# Patient Record
Sex: FEMALE | Race: WHITE | URBAN - METROPOLITAN AREA
[De-identification: names, ages, dates, MRNs, and addresses within clinical notes are randomized per-mention and may not be internally consistent; named-entity substitution may affect disease eponyms.]

---

## 2021-09-30 ENCOUNTER — OUT OF OFFICE VISIT (OUTPATIENT)
Dept: URBAN - METROPOLITAN AREA MEDICAL CENTER 1 | Facility: MEDICAL CENTER | Age: 71
End: 2021-09-30
Payer: MEDICARE

## 2021-09-30 DIAGNOSIS — K29.60 ADENOPAPILLOMATOSIS GASTRICA: ICD-10-CM

## 2021-09-30 DIAGNOSIS — Z79.1 ENCNTR LONG-TERM NSAID USE: ICD-10-CM

## 2021-09-30 DIAGNOSIS — K92.1 ACUTE MELENA: ICD-10-CM

## 2021-09-30 DIAGNOSIS — D64.89 ANEMIA DUE TO OTHER CAUSE: ICD-10-CM

## 2021-09-30 DIAGNOSIS — K29.00 ACUTE EROSIVE GASTRITIS: ICD-10-CM

## 2021-09-30 DIAGNOSIS — D50.9 ANEMIA: ICD-10-CM

## 2021-09-30 DIAGNOSIS — K25.9 ANTRAL ULCER: ICD-10-CM

## 2021-09-30 PROCEDURE — 99232 SBSQ HOSP IP/OBS MODERATE 35: CPT | Performed by: INTERNAL MEDICINE

## 2021-09-30 PROCEDURE — G8427 DOCREV CUR MEDS BY ELIG CLIN: HCPCS | Performed by: INTERNAL MEDICINE

## 2021-09-30 PROCEDURE — 43239 EGD BIOPSY SINGLE/MULTIPLE: CPT | Performed by: INTERNAL MEDICINE

## 2021-09-30 PROCEDURE — 99222 1ST HOSP IP/OBS MODERATE 55: CPT | Performed by: INTERNAL MEDICINE

## 2021-10-18 ENCOUNTER — OFFICE VISIT (OUTPATIENT)
Dept: URBAN - NONMETROPOLITAN AREA CLINIC 2 | Facility: CLINIC | Age: 71
End: 2021-10-18

## 2021-10-18 RX ORDER — ALBUTEROL SULFATE 90 UG/1
AEROSOL, METERED RESPIRATORY (INHALATION)
Qty: 54 | Status: ACTIVE | COMMUNITY

## 2021-10-18 RX ORDER — METOPROLOL TARTRATE 25 MG/1
TABLET, FILM COATED ORAL
Qty: 180 | Status: ACTIVE | COMMUNITY

## 2021-10-18 RX ORDER — FLUTICASONE PROPIONATE 50 UG/1
SPRAY, METERED NASAL
Qty: 32 | Status: ACTIVE | COMMUNITY

## 2021-10-18 RX ORDER — DENOSUMAB 60 MG/ML
INJECTION SUBCUTANEOUS
Qty: 1 | Status: ACTIVE | COMMUNITY

## 2021-10-18 RX ORDER — MELOXICAM 15 MG/1
TABLET ORAL
Qty: 90 | Status: ACTIVE | COMMUNITY

## 2021-10-18 RX ORDER — TOPIRAMATE 25 MG/1
TABLET, FILM COATED ORAL
Qty: 90 | Status: ACTIVE | COMMUNITY

## 2021-10-18 RX ORDER — PANTOPRAZOLE 40 MG/1
TABLET, DELAYED RELEASE ORAL
Qty: 60 | Status: ACTIVE | COMMUNITY

## 2021-11-17 ENCOUNTER — WEB ENCOUNTER (OUTPATIENT)
Dept: URBAN - NONMETROPOLITAN AREA CLINIC 2 | Facility: CLINIC | Age: 71
End: 2021-11-17

## 2021-11-17 ENCOUNTER — OFFICE VISIT (OUTPATIENT)
Dept: URBAN - NONMETROPOLITAN AREA CLINIC 2 | Facility: CLINIC | Age: 71
End: 2021-11-17
Payer: MEDICARE

## 2021-11-17 ENCOUNTER — LAB OUTSIDE AN ENCOUNTER (OUTPATIENT)
Dept: URBAN - NONMETROPOLITAN AREA CLINIC 2 | Facility: CLINIC | Age: 71
End: 2021-11-17

## 2021-11-17 VITALS
HEART RATE: 76 BPM | WEIGHT: 145.2 LBS | TEMPERATURE: 97.3 F | SYSTOLIC BLOOD PRESSURE: 146 MMHG | HEIGHT: 64 IN | DIASTOLIC BLOOD PRESSURE: 83 MMHG | BODY MASS INDEX: 24.79 KG/M2

## 2021-11-17 DIAGNOSIS — K59.00 CONSTIPATION, UNSPECIFIED CONSTIPATION TYPE: ICD-10-CM

## 2021-11-17 DIAGNOSIS — K62.5 BRIGHT RED BLOOD PER RECTUM: ICD-10-CM

## 2021-11-17 DIAGNOSIS — Z12.11 COLON CANCER SCREENING: ICD-10-CM

## 2021-11-17 DIAGNOSIS — D50.0 IRON DEFICIENCY ANEMIA DUE TO CHRONIC BLOOD LOSS: ICD-10-CM

## 2021-11-17 DIAGNOSIS — K25.0 ACUTE GASTRIC ULCER WITH HEMORRHAGE: ICD-10-CM

## 2021-11-17 PROCEDURE — 99214 OFFICE O/P EST MOD 30 MIN: CPT | Performed by: NURSE PRACTITIONER

## 2021-11-17 RX ORDER — TOPIRAMATE 25 MG/1
TABLET, FILM COATED ORAL
Qty: 90 | Status: ACTIVE | COMMUNITY

## 2021-11-17 RX ORDER — ALBUTEROL SULFATE 90 UG/1
AEROSOL, METERED RESPIRATORY (INHALATION)
Qty: 54 | Status: ACTIVE | COMMUNITY

## 2021-11-17 RX ORDER — MELOXICAM 15 MG/1
TABLET ORAL
Qty: 90 | Status: ACTIVE | COMMUNITY

## 2021-11-17 RX ORDER — PANTOPRAZOLE 40 MG/1
TABLET, DELAYED RELEASE ORAL
Qty: 60 | Status: ACTIVE | COMMUNITY

## 2021-11-17 RX ORDER — DENOSUMAB 60 MG/ML
INJECTION SUBCUTANEOUS
Qty: 1 | Status: ACTIVE | COMMUNITY

## 2021-11-17 RX ORDER — METOPROLOL TARTRATE 25 MG/1
TABLET, FILM COATED ORAL
Qty: 180 | Status: ACTIVE | COMMUNITY

## 2021-11-17 RX ORDER — FLUTICASONE PROPIONATE 50 UG/1
SPRAY, METERED NASAL
Qty: 32 | Status: ACTIVE | COMMUNITY

## 2021-11-17 RX ORDER — PANTOPRAZOLE SODIUM 40 MG/1
1 TABLET TABLET, DELAYED RELEASE ORAL BID
Qty: 180 TABLET | Refills: 3 | OUTPATIENT
Start: 2021-11-17

## 2021-11-17 NOTE — HPI-TODAY'S VISIT:
11/17/2021 Ms Viola Covington is a 71 year old female here for hospital f/u of GI bleeding. She started having dark stool the end of September. She required multiple transfusions and had an EGD with 2 large ulcers- 25mm and 10mm. She was started on protonix 40mg BID and her bleeding stopped. It was felt her meloxicam was contributing. She denies any further NSAID use. She was feeling dizzy after discharge so she on her own started taking oral iron. She has a hx of constipation and hemorrhoids. October 24, she started having bright red blood in her stool. She went to the ER and kept over night. She had a KUB with constipation. Her Hbg remained stable and she was able to be discharged. Her last colonsocopy was 2-3 years ago by Dr Vanegas in [facility]. She is taking 3 colace a day. She continues to have some constipation. CS

## 2021-11-18 LAB
HEMATOCRIT: 36.4
HEMOGLOBIN: 11.6
IRON BIND.CAP.(TIBC): 338
IRON SATURATION: 28
IRON: 96
MCH: 27.6
MCHC: 31.9
MCV: 87
NRBC: (no result)
PLATELETS: 248
RBC: 4.2
RDW: 16.7
UIBC: 242
WBC: 5.6

## 2021-11-22 ENCOUNTER — OFFICE VISIT (OUTPATIENT)
Dept: URBAN - NONMETROPOLITAN AREA SURGERY CENTER 1 | Facility: SURGERY CENTER | Age: 71
End: 2021-11-22
Payer: MEDICARE

## 2021-11-22 DIAGNOSIS — K27.9 PEPTIC ULCER: ICD-10-CM

## 2021-11-22 PROCEDURE — 43235 EGD DIAGNOSTIC BRUSH WASH: CPT | Performed by: INTERNAL MEDICINE

## 2021-11-22 PROCEDURE — G8907 PT DOC NO EVENTS ON DISCHARG: HCPCS | Performed by: INTERNAL MEDICINE

## 2022-01-06 ENCOUNTER — OFFICE VISIT (OUTPATIENT)
Dept: URBAN - NONMETROPOLITAN AREA CLINIC 13 | Facility: CLINIC | Age: 72
End: 2022-01-06
Payer: MEDICARE

## 2022-01-06 VITALS
WEIGHT: 140.4 LBS | HEIGHT: 64 IN | TEMPERATURE: 97.6 F | DIASTOLIC BLOOD PRESSURE: 80 MMHG | BODY MASS INDEX: 23.97 KG/M2 | HEART RATE: 60 BPM | SYSTOLIC BLOOD PRESSURE: 133 MMHG

## 2022-01-06 DIAGNOSIS — D50.0 IRON DEFICIENCY ANEMIA DUE TO CHRONIC BLOOD LOSS: ICD-10-CM

## 2022-01-06 DIAGNOSIS — K62.5 BRIGHT RED BLOOD PER RECTUM: ICD-10-CM

## 2022-01-06 DIAGNOSIS — Z12.11 COLON CANCER SCREENING: ICD-10-CM

## 2022-01-06 DIAGNOSIS — K21.9 GERD WITHOUT ESOPHAGITIS: ICD-10-CM

## 2022-01-06 DIAGNOSIS — K59.00 CONSTIPATION, UNSPECIFIED CONSTIPATION TYPE: ICD-10-CM

## 2022-01-06 DIAGNOSIS — K25.0 ACUTE GASTRIC ULCER WITH HEMORRHAGE: ICD-10-CM

## 2022-01-06 PROCEDURE — 99213 OFFICE O/P EST LOW 20 MIN: CPT | Performed by: NURSE PRACTITIONER

## 2022-01-06 RX ORDER — PANTOPRAZOLE SODIUM 20 MG/1
1 TABLET TABLET, DELAYED RELEASE ORAL ONCE A DAY
Qty: 90 | Refills: 3 | OUTPATIENT

## 2022-01-06 RX ORDER — FAMOTIDINE 20 MG/1
1 TABLET AT BEDTIME AS NEEDED TABLET, FILM COATED ORAL ONCE A DAY
Qty: 90 TABLET | Refills: 11 | OUTPATIENT
Start: 2022-01-06

## 2022-01-06 RX ORDER — PANTOPRAZOLE SODIUM 40 MG/1
1 TABLET TABLET, DELAYED RELEASE ORAL BID
Qty: 180 TABLET | Refills: 3 | Status: ACTIVE | COMMUNITY
Start: 2021-11-17

## 2022-01-06 RX ORDER — TOPIRAMATE 25 MG/1
TABLET, FILM COATED ORAL
Qty: 90 | Status: ACTIVE | COMMUNITY

## 2022-01-06 RX ORDER — MELOXICAM 15 MG/1
TABLET ORAL
Qty: 90 | Status: DISCONTINUED | COMMUNITY

## 2022-01-06 RX ORDER — ALBUTEROL SULFATE 90 UG/1
AEROSOL, METERED RESPIRATORY (INHALATION)
Qty: 54 | Status: ACTIVE | COMMUNITY

## 2022-01-06 RX ORDER — DENOSUMAB 60 MG/ML
INJECTION SUBCUTANEOUS
Qty: 1 | Status: ACTIVE | COMMUNITY

## 2022-01-06 RX ORDER — PANTOPRAZOLE 40 MG/1
TABLET, DELAYED RELEASE ORAL
Qty: 60 | Status: ACTIVE | COMMUNITY

## 2022-01-06 RX ORDER — METOPROLOL TARTRATE 25 MG/1
TABLET, FILM COATED ORAL
Qty: 180 | Status: ACTIVE | COMMUNITY

## 2022-01-06 RX ORDER — FLUTICASONE PROPIONATE 50 UG/1
SPRAY, METERED NASAL
Qty: 32 | Status: ACTIVE | COMMUNITY

## 2022-01-06 RX ORDER — HYDROCORTISONE ACETATE AND PRAMOXINE HYDROCHLORIDE 25; 10 MG/G; MG/G
1 APPLICATION CREAM TOPICAL THREE TIMES A DAY
Qty: 1 TUBE | Refills: 3 | OUTPATIENT
Start: 2022-01-06 | End: 2022-03-03

## 2022-01-06 NOTE — HPI-TODAY'S VISIT:
1/6/2022 Ms Viola Covington is here for  f/u of GI bleeding. She started having dark stool the end of September. She had an EGD with 2 large ulcers- 25mm and 10mm. She was started on protonix 40mg BID and her bleeding stopped. It was felt her meloxicam was contributing. She had a repeat EGD with healing. It did show some retained food in her stomach. She denies any issues with nausea, vomiting, or abdominal pain. She generally eats a small breakfast of oatmeal, snacks, and then a regular dinner. She denies any reflux on protonix BID. Her labs showed a normal iron and Hbg. She is now taking oral iron 3 days a week. She continues to have a small amount of blood on the toliet tissue every other day. She denies any pain. She has hemorrhoids. Her bowels are now more normal with colace and miralax. CS

## 2022-01-06 NOTE — HPI-OTHER HISTORIES
11/17/2021 Ms Viola Covington is a 71 year old female here for hospital f/u of GI bleeding. She started having dark stool the end of September. She required multiple transfusions and had an EGD with 2 large ulcers- 25mm and 10mm. She was started on protonix 40mg BID and her bleeding stopped. It was felt her meloxicam was contributing. She denies any further NSAID use. She was feeling dizzy after discharge so she on her own started taking oral iron. She has a hx of constipation and hemorrhoids. October 24, she started having bright red blood in her stool. She went to the ER and kept over night. She had a KUB with constipation. Her Hbg remained stable and she was able to be discharged. Her last colonsocopy was 2-3 years ago by Dr Vanegas in [facility]. She is taking 3 colace a day. She continues to have some constipation. CS

## 2022-04-07 ENCOUNTER — OFFICE VISIT (OUTPATIENT)
Dept: URBAN - NONMETROPOLITAN AREA CLINIC 13 | Facility: CLINIC | Age: 72
End: 2022-04-07

## 2022-04-14 ENCOUNTER — DASHBOARD ENCOUNTERS (OUTPATIENT)
Age: 72
End: 2022-04-14

## 2022-04-14 ENCOUNTER — OFFICE VISIT (OUTPATIENT)
Dept: URBAN - NONMETROPOLITAN AREA CLINIC 13 | Facility: CLINIC | Age: 72
End: 2022-04-14
Payer: MEDICARE

## 2022-04-14 VITALS
SYSTOLIC BLOOD PRESSURE: 146 MMHG | BODY MASS INDEX: 24.52 KG/M2 | HEART RATE: 60 BPM | WEIGHT: 143.6 LBS | TEMPERATURE: 97.5 F | HEIGHT: 64 IN | DIASTOLIC BLOOD PRESSURE: 78 MMHG

## 2022-04-14 DIAGNOSIS — Z12.11 COLON CANCER SCREENING: ICD-10-CM

## 2022-04-14 DIAGNOSIS — K21.9 GERD WITHOUT ESOPHAGITIS: ICD-10-CM

## 2022-04-14 DIAGNOSIS — K62.5 BRIGHT RED BLOOD PER RECTUM: ICD-10-CM

## 2022-04-14 DIAGNOSIS — D50.0 IRON DEFICIENCY ANEMIA DUE TO CHRONIC BLOOD LOSS: ICD-10-CM

## 2022-04-14 DIAGNOSIS — K59.00 CONSTIPATION, UNSPECIFIED CONSTIPATION TYPE: ICD-10-CM

## 2022-04-14 DIAGNOSIS — K25.0 ACUTE GASTRIC ULCER WITH HEMORRHAGE: ICD-10-CM

## 2022-04-14 PROBLEM — 266435005: Status: ACTIVE | Noted: 2022-01-06

## 2022-04-14 PROBLEM — 724556004: Status: ACTIVE | Noted: 2021-11-17

## 2022-04-14 PROBLEM — 14760008: Status: ACTIVE | Noted: 2021-11-17

## 2022-04-14 PROCEDURE — 99213 OFFICE O/P EST LOW 20 MIN: CPT | Performed by: NURSE PRACTITIONER

## 2022-04-14 RX ORDER — FAMOTIDINE 20 MG/1
1 TABLET AT BEDTIME AS NEEDED TABLET, FILM COATED ORAL ONCE A DAY
Qty: 90 TABLET | Refills: 11 | Status: ACTIVE | COMMUNITY
Start: 2022-01-06

## 2022-04-14 RX ORDER — PANTOPRAZOLE 40 MG/1
TABLET, DELAYED RELEASE ORAL
Qty: 60 | Status: ACTIVE | COMMUNITY

## 2022-04-14 RX ORDER — FLUTICASONE PROPIONATE 50 UG/1
SPRAY, METERED NASAL
Qty: 32 | Status: ACTIVE | COMMUNITY

## 2022-04-14 RX ORDER — DENOSUMAB 60 MG/ML
INJECTION SUBCUTANEOUS
Qty: 1 | Status: ACTIVE | COMMUNITY

## 2022-04-14 RX ORDER — PANTOPRAZOLE SODIUM 20 MG/1
1 TABLET TABLET, DELAYED RELEASE ORAL ONCE A DAY
Qty: 90 | Refills: 3 | Status: ACTIVE | COMMUNITY

## 2022-04-14 RX ORDER — PANTOPRAZOLE SODIUM 20 MG/1
1 TABLET TABLET, DELAYED RELEASE ORAL ONCE A DAY
Qty: 90 | Refills: 3 | OUTPATIENT

## 2022-04-14 RX ORDER — TOPIRAMATE 25 MG/1
TABLET, FILM COATED ORAL
Qty: 90 | Status: ACTIVE | COMMUNITY

## 2022-04-14 RX ORDER — FAMOTIDINE 20 MG/1
1 TABLET AT BEDTIME AS NEEDED TABLET, FILM COATED ORAL ONCE A DAY
Qty: 90 TABLET | Refills: 11 | OUTPATIENT

## 2022-04-14 RX ORDER — METOPROLOL TARTRATE 25 MG/1
TABLET, FILM COATED ORAL
Qty: 180 | Status: ACTIVE | COMMUNITY

## 2022-04-14 RX ORDER — ALBUTEROL SULFATE 90 UG/1
AEROSOL, METERED RESPIRATORY (INHALATION)
Qty: 54 | Status: ACTIVE | COMMUNITY

## 2022-04-14 NOTE — HPI-TODAY'S VISIT:
4/14/2022 Ms Viola Covington is here for  f/u of GI bleeding. She started having dark stool the end of September. She had an EGD with 2 large ulcers- 25mm and 10mm. She was started on protonix 40mg BID and her bleeding stopped. It was felt her meloxicam was contributing. She had a repeat EGD with healing. It did show some retained food in her stomach. She denies any issues with nausea, vomiting, or abdominal pain. She generally eats a small breakfast of oatmeal, snacks, and then a regular dinner. She denies any reflux on protonix. In November, her labs showed a normal iron and Hbg. At her last OV, she was taking oral iron 3 days a week. Her Hbg was rechecked and normal so we stopped her iron and reduced her protonix to 20mg daily with pepcid prn. Today, she has not stopped her iron. She gets dizzy when she is not on it. She denies any reflux and has not needed the pepcid. Her bowels move great with the miralax daily. Overall, she is feeling well today and planning a trip to [place] to see her daughter and great grand children. CS

## 2022-04-14 NOTE — HPI-OTHER HISTORIES
11/17/2021 Ms Viola Covington is a 71 year old female here for hospital f/u of GI bleeding. She started having dark stool the end of September. She required multiple transfusions and had an EGD with 2 large ulcers- 25mm and 10mm. She was started on protonix 40mg BID and her bleeding stopped. It was felt her meloxicam was contributing. She denies any further NSAID use. She was feeling dizzy after discharge so she on her own started taking oral iron. She has a hx of constipation and hemorrhoids. October 24, she started having bright red blood in her stool. She went to the ER and kept over night. She had a KUB with constipation. Her Hbg remained stable and she was able to be discharged. Her last colonsocopy was 2-3 years ago by Dr Vanegas in [facility]. She is taking 3 colace a day. She continues to have some constipation.    1/6/2022 Ms Viola Covington is here for  f/u of GI bleeding. She started having dark stool the end of September. She had an EGD with 2 large ulcers- 25mm and 10mm. She was started on protonix 40mg BID and her bleeding stopped. It was felt her meloxicam was contributing. She had a repeat EGD with healing. It did show some retained food in her stomach. She denies any issues with nausea, vomiting, or abdominal pain. She generally eats a small breakfast of oatmeal, snacks, and then a regular dinner. She denies any reflux on protonix BID. Her labs showed a normal iron and Hbg. She is now taking oral iron 3 days a week. She continues to have a small amount of blood on the toliet tissue every other day. She denies any pain. She has hemorrhoids. Her bowels are now more normal with colace and miralax.

## 2023-03-13 ENCOUNTER — ERX REFILL RESPONSE (OUTPATIENT)
Dept: URBAN - NONMETROPOLITAN AREA CLINIC 2 | Facility: CLINIC | Age: 73
End: 2023-03-13

## 2023-03-13 RX ORDER — PANTOPRAZOLE SODIUM 20 MG/1
1 TABLET TABLET, DELAYED RELEASE ORAL ONCE A DAY
Qty: 90 | Refills: 3 | OUTPATIENT